# Patient Record
Sex: MALE | Race: WHITE | NOT HISPANIC OR LATINO | Employment: FULL TIME | URBAN - METROPOLITAN AREA
[De-identification: names, ages, dates, MRNs, and addresses within clinical notes are randomized per-mention and may not be internally consistent; named-entity substitution may affect disease eponyms.]

---

## 2019-09-13 DIAGNOSIS — M25.551 PAIN OF BOTH HIP JOINTS: Primary | ICD-10-CM

## 2019-09-13 DIAGNOSIS — M25.552 PAIN OF BOTH HIP JOINTS: Primary | ICD-10-CM

## 2019-09-20 ENCOUNTER — OFFICE VISIT (OUTPATIENT)
Dept: ORTHOPEDICS | Facility: CLINIC | Age: 49
End: 2019-09-20
Payer: COMMERCIAL

## 2019-09-20 ENCOUNTER — HOSPITAL ENCOUNTER (OUTPATIENT)
Dept: RADIOLOGY | Facility: HOSPITAL | Age: 49
Discharge: HOME | End: 2019-09-20
Attending: ORTHOPAEDIC SURGERY
Payer: COMMERCIAL

## 2019-09-20 VITALS
HEIGHT: 66 IN | SYSTOLIC BLOOD PRESSURE: 142 MMHG | BODY MASS INDEX: 34.55 KG/M2 | HEART RATE: 90 BPM | DIASTOLIC BLOOD PRESSURE: 92 MMHG | WEIGHT: 215 LBS

## 2019-09-20 DIAGNOSIS — Z96.641 H/O TOTAL HIP ARTHROPLASTY, RIGHT: Primary | ICD-10-CM

## 2019-09-20 DIAGNOSIS — M25.551 PAIN OF BOTH HIP JOINTS: ICD-10-CM

## 2019-09-20 DIAGNOSIS — M25.552 PAIN OF BOTH HIP JOINTS: ICD-10-CM

## 2019-09-20 DIAGNOSIS — M54.50 ACUTE RIGHT-SIDED LOW BACK PAIN WITHOUT SCIATICA: ICD-10-CM

## 2019-09-20 PROCEDURE — 99999 PR PBB SHADOW E&M-EST. PATIENT-LVL III: CPT | Mod: PBBFAC,,, | Performed by: ORTHOPAEDIC SURGERY

## 2019-09-20 PROCEDURE — 99999 PR PBB SHADOW E&M-EST. PATIENT-LVL III: ICD-10-PCS | Mod: PBBFAC,,, | Performed by: ORTHOPAEDIC SURGERY

## 2019-09-20 PROCEDURE — 3008F BODY MASS INDEX DOCD: CPT | Mod: CPTII,S$GLB,, | Performed by: ORTHOPAEDIC SURGERY

## 2019-09-20 PROCEDURE — 3008F PR BODY MASS INDEX (BMI) DOCUMENTED: ICD-10-PCS | Mod: CPTII,S$GLB,, | Performed by: ORTHOPAEDIC SURGERY

## 2019-09-20 PROCEDURE — 73521 XR HIPS BILATERAL 2 VIEW INCL AP PELVIS: ICD-10-PCS | Mod: 26,,, | Performed by: RADIOLOGY

## 2019-09-20 PROCEDURE — 99203 PR OFFICE/OUTPT VISIT, NEW, LEVL III, 30-44 MIN: ICD-10-PCS | Mod: S$GLB,,, | Performed by: ORTHOPAEDIC SURGERY

## 2019-09-20 PROCEDURE — 73521 X-RAY EXAM HIPS BI 2 VIEWS: CPT | Mod: 26,,, | Performed by: RADIOLOGY

## 2019-09-20 PROCEDURE — 73521 X-RAY EXAM HIPS BI 2 VIEWS: CPT | Mod: TC

## 2019-09-20 PROCEDURE — 99203 OFFICE O/P NEW LOW 30 MIN: CPT | Mod: S$GLB,,, | Performed by: ORTHOPAEDIC SURGERY

## 2019-09-20 RX ORDER — ASPIRIN 81 MG/1
81 TABLET ORAL DAILY
COMMUNITY

## 2019-09-20 RX ORDER — PHENTERMINE HYDROCHLORIDE 37.5 MG/1
37.5 TABLET ORAL DAILY
COMMUNITY
Start: 2019-09-11 | End: 2023-11-06

## 2019-09-20 RX ORDER — INDOMETHACIN 50 MG/1
50 CAPSULE ORAL 2 TIMES DAILY PRN
COMMUNITY
Start: 2019-09-11 | End: 2019-10-11

## 2019-09-20 RX ORDER — TIZANIDINE HYDROCHLORIDE 4 MG/1
4 CAPSULE, GELATIN COATED ORAL 3 TIMES DAILY PRN
COMMUNITY
End: 2023-11-06

## 2019-09-20 RX ORDER — SULFACETAMIDE SODIUM 100 MG/ML
SOLUTION/ DROPS OPHTHALMIC DAILY PRN
COMMUNITY
Start: 2019-07-15

## 2019-09-20 RX ORDER — TESTOSTERONE CYPIONATE 200 MG/ML
INJECTION, SOLUTION INTRAMUSCULAR
COMMUNITY

## 2019-09-20 RX ORDER — DEXTROAMPHETAMINE SACCHARATE, AMPHETAMINE ASPARTATE, DEXTROAMPHETAMINE SULFATE AND AMPHETAMINE SULFATE 5; 5; 5; 5 MG/1; MG/1; MG/1; MG/1
20 TABLET ORAL DAILY
COMMUNITY
Start: 2019-07-31 | End: 2023-11-06

## 2019-09-20 RX ORDER — BENAZEPRIL HYDROCHLORIDE 20 MG/1
20 TABLET ORAL DAILY
COMMUNITY
Start: 2019-08-15

## 2019-09-20 NOTE — PROGRESS NOTES
Subjective:     Patient ID: Kasi Main is a 49 y.o. male.    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  Has not been followed up and is here to make sure components are okay.  He is not having any pain in his groin or in his hip area.  He is back to regular activities.  A week ago started with some back pain on the a lumbar area.  He takes Indocin for gout and he is taking only 1 pill yesterday.  Denies loss of bowel bladder control denies any numbness or tingling in the legs denies pain in the other hip.  Denies any usage of any assistive devices and is back to his regular job.  He is doing independent exercise program  Past Medical History:   Diagnosis Date    ADD (attention deficit disorder)     Hypertension     Polycythemia      Past Surgical History:   Procedure Laterality Date    JOINT REPLACEMENT Right 08/26/2015    Right Hip     Family History   Problem Relation Age of Onset    Clotting disorder Mother     Diabetes Mother     Cancer Father      Social History     Socioeconomic History    Marital status:      Spouse name: Not on file    Number of children: Not on file    Years of education: Not on file    Highest education level: Not on file   Occupational History    Not on file   Social Needs    Financial resource strain: Not on file    Food insecurity:     Worry: Not on file     Inability: Not on file    Transportation needs:     Medical: Not on file     Non-medical: Not on file   Tobacco Use    Smoking status: Never Smoker    Smokeless tobacco: Current User     Types: Snuff   Substance and Sexual Activity    Alcohol use: Yes     Comment: rarely    Drug use: Never    Sexual activity: Not on file   Lifestyle    Physical activity:     Days per week: Not on  file     Minutes per session: Not on file    Stress: Not on file   Relationships    Social connections:     Talks on phone: Not on file     Gets together: Not on file     Attends Jewish service: Not on file     Active member of club or organization: Not on file     Attends meetings of clubs or organizations: Not on file     Relationship status: Not on file   Other Topics Concern    Not on file   Social History Narrative    Not on file     Medication List with Changes/Refills   Current Medications    ASPIRIN (ECOTRIN) 81 MG EC TABLET    Take 81 mg by mouth once daily.    BENAZEPRIL (LOTENSIN) 20 MG TABLET    Take 20 mg by mouth once daily.    DEXTROAMPHETAMINE-AMPHETAMINE (ADDERALL) 20 MG TABLET    Take 20 mg by mouth once daily.    INDOMETHACIN (INDOCIN) 50 MG CAPSULE    Take 50 mg by mouth 2 (two) times daily as needed.    PHENTERMINE (ADIPEX-P) 37.5 MG TABLET    Take 37.5 mg by mouth once daily.    SULFACETAMIDE SODIUM 10% (BLEPH-10) 10 % OPHTHALMIC SOLUTION    Place into both eyes daily as needed.    TESTOSTERONE CYPIONATE (DEPOTESTOTERONE CYPIONATE) 200 MG/ML INJECTION    Inject into the muscle every 14 (fourteen) days.    TIZANIDINE 4 MG CAP    Take 4 mg by mouth 3 (three) times daily as needed.     Review of patient's allergies indicates:  No Known Allergies  Review of Systems   Constitution: Negative for decreased appetite.   HENT: Negative for tinnitus.    Eyes: Negative for double vision.   Cardiovascular: Negative for chest pain.   Respiratory: Negative for wheezing.    Hematologic/Lymphatic: Negative for bleeding problem.   Skin: Negative for dry skin.   Musculoskeletal: Positive for back pain. Negative for arthritis, gout, neck pain and stiffness.   Gastrointestinal: Negative for abdominal pain.   Genitourinary: Negative for bladder incontinence.   Neurological: Negative for numbness, paresthesias and sensory change.   Psychiatric/Behavioral: Negative for altered mental status.       Objective:    Body mass index is 34.7 kg/m².  Vitals:    09/20/19 0810   BP: (!) 142/92   Pulse: 90          General    Constitutional: He is oriented to person, place, and time. He appears well-developed.   HENT:   Head: Normocephalic and atraumatic.   Eyes: EOM are normal.   Neck: Normal range of motion. Neck supple. No JVD present. No tracheal deviation present.   Cardiovascular: Normal rate.    Pulmonary/Chest: Effort normal. No respiratory distress. He has no wheezes.   Abdominal: Soft.   Lymphadenopathy:     He has no cervical adenopathy.   Neurological: He is alert and oriented to person, place, and time.   Psychiatric: Thought content normal.     General Musculoskeletal Exam   Gait: normal   Pelvic Obliquity: none    Right Ankle/Foot Exam   Right ankle exam is normal.    Range of Motion   The patient has normal right ankle ROM.  Ankle Joint   Dorsiflexion: normal   Plantar flexion: normal     Muscle Strength   The patient has normal right ankle strength.    Tests   Heel Walk: able to perform  Tiptoe Walk: able to perform    Left Ankle/Foot Exam   Left ankle exam is normal.    Range of Motion   The patient has normal left ankle ROM.   Ankle Joint  Dorsiflexion: normal   Plantar flexion: normal     Tests   Heel Walk: able to perform  Tiptoe Walk: able to perform    Right Knee Exam   Right knee exam is normal.    Inspection   Alignment:  normal  Effusion: absent  Deformity: absent    Range of Motion   Extension: normal   Flexion: normal     Tests   Meniscus   Park:  Medial - negative Lateral - negative  Ligament Examination Lachman: normal (-1 to 2mm) PCL-Posterior Drawer: normal (0 to 2mm)     MCL - Valgus: normal (0 to 2mm)  LCL - Varus: normal  Posterior Sag Test: negative  Patella   Patellar apprehension: negative  Passive Patellar Tilt: neutral  Patellar Tracking: normal    Other   Sensation: normal    Left Knee Exam   Left knee exam is normal.    Inspection   Alignment:  normal  Effusion: absent  Deformity:  absent    Range of Motion   Extension: normal   Flexion: normal     Tests   Meniscus   Park:  Medial - negative Lateral - negative  Stability Lachman: normal (-1 to 2mm) PCL-Posterior Drawer: normal (0 to 2mm)  MCL - Valgus: normal (0 to 2mm)  LCL - Varus: normal (0 to 2mm)  Posterior Sag Test: negative  Patella   Patellar apprehension: negative  Passive Patellar Tilt: neutral  Patellar Tracking: normal    Other   Sensation: normal    Right Hip Exam   Right hip exam is normal.     Inspection   Scars: present  No deformity of hip.  Quadriceps Atrophy:  Negative    Range of Motion   Abduction: normal   Adduction: normal   Extension: normal   Flexion: normal   External rotation: normal   Internal rotation: normal     Muscle Strength   The patient has normal right hip strength.    Other   Sensation: normal  Left Hip Exam   Left hip exam is normal.    Inspection   No deformity of hip.  Quadriceps Atrophy:  negative    Range of Motion   Abduction: normal   Adduction: normal   Extension: normal   Flexion: normal   External rotation: normal   Internal rotation: normal     Muscle Strength   The patient has normal left hip strength.     Other   Sensation: normal      Back (L-Spine & T-Spine) / Neck (C-Spine) Exam     Tenderness Right paramedian tenderness of the Lower L-Spine. Left paramedian tenderness of the Lower L-Spine.     Back (L-Spine & T-Spine) Range of Motion   Extension:  10 abnormal   Flexion:  60 abnormal   Lateral bend right: normal   Lateral bend left: normal   Rotation right: normal   Rotation left: normal     Neck (C-Spine) Range of Motion   Flexion:     Normal  Extension: Normal  Right Lateral Bend: normal  Left Lateral Bend: normal  Right Rotation: normal  Left Rotation: normal    Spinal Sensation   Right Side Sensation  C-Spine Level: normal   L-Spine Level: normal  S-Spine Level: normal  T-Spine Level: normal  Left Side Sensation  C-Spine Level: normal  L-Spine Level: normal  S-Spine Level:  normal  T-Spine Level: normal    Other He has no scoliosis .  Spinal Kyphosis:  Absent  Spinal Lordosis:  Absent  Head Tilt:  Negative    Comments:  Forward bending to mid tibia lateral bending to the knee.  Pain localized around L4-L5 paraspinal area bilaterally      Right Hand/Wrist Exam   Right hand exam is normal.    Inspection   Effusion: Wrist - absent Hand -  absent  Deformity: Wrist - deformity Hand -  deformity    Tests   Phalens Sign: negative  Tinel's sign (median nerve): negative  Finkelstein's test: negative  Carpal Tunnel Compression Test: negative  Cubital Tunnel Compression Test: negative        Left Hand/Wrist Exam   Left hand exam is normal.    Inspection   Effusion: Wrist - absent Hand -  absent  Deformity: Wrist - absent Hand -  absent    Tests   Phalens Sign: negative  Tinel's sign (median nerve): negative  Finkelstein's test: negative  Carpal Tunnel Compression Test: negative  Cubital Tunnel Compression Test: negative        Right Elbow Exam   Right elbow exam is normal.    Tests   Tinel's sign (cubital tunnel): negative      Left Elbow Exam   Left elbow exam is normal.    Tests   Tinel's sign (cubital tunnel): negative    Right Shoulder Exam   Right shoulder exam is normal.    Inspection/Observation   Bruising: absent  Deformity: absent  Scapular Winging: absent  Atrophy: absent    Tenderness   The patient is experiencing no tenderness.    Range of Motion   Active abduction:  130 normal   Passive abduction: normal   Extension: normal   Forward Flexion: normal   Forward Elevation: normal  Adduction: normal  External Rotation 90 degrees: normal  Internal rotation 0 degrees: normal   Internal rotation 90 degrees: normal     Tests & Signs   Apprehension: negative  Cross arm: negative  Drop arm: negative  Impingement: negative  Rotator Cuff Painful Arc/Range: mild  Lift Off Sign: negative  Relocation 90 degrees: negative    Other   Sensation: normal    Left Shoulder Exam   Left shoulder exam is  normal.    Inspection/Observation   Bruising: absent  Deformity: absent  Scapular Winging: absent  Atrophy: absent    Tenderness   The patient is experiencing no tenderness.     Range of Motion   Active abduction: normal   Passive abduction: normal   Extension: normal   Forward Flexion: normal   Forward Elevation: normal  Adduction: normal  External Rotation 0 degrees: normal   External Rotation 90 degrees: normal  Internal rotation 0 degrees: normal   Internal rotation 90 degrees: normal     Tests & Signs   Apprehension: negative  Cross arm: negative  Drop arm: negative  Impingement: negative  Lift Off Sign: negative  Relocation 90 degrees: negative    Other   Sensation: normal       Muscle Strength   Right Upper Extremity   Shoulder Abduction:    Shoulder Internal Rotation:    Shoulder External Rotation:    Supraspinatus:    Subscapularis:    Biceps:    Deltoid:    Triceps:    Wrist extension:    Wrist flexion:    Finger Flexors:    Finger Extensors:    Left Upper Extremity  Shoulder Abduction:    Shoulder Internal Rotation:    Shoulder External Rotation:    Supraspinatus:    Subscapularis:    Biceps:    Deltoid:    Triceps:    Wrist extension:    Wrist flexion:    Finger Flexors:    Finger Extensors:    Right Lower Extremity   Hip Abduction:    Hip Flexion:    Hip Extensors:   Quadriceps:     Hamstrin/5   Anterior tibial:    Gastrocsoleus:    EHL:    Left Lower Extremity   Hip Abduction:    Hip Flexion:    Hip Extensors:   Quadriceps:     Hamstrin/5   Anterior tibial:     EHL:      Reflexes     Left Side  Biceps:  1+  Triceps:  1+  Quadriceps:  1+  Achilles:  1+    Right Side   Biceps:  1+  Triceps:  1+  Quadriceps:  1+  Achilles:  1+    Vascular Exam     Right Pulses  Dorsalis Pedis:      1+  Posterior Tibial:      1+  Radial:                    1+      Left  Pulses  Dorsalis Pedis:      1+  Posterior Tibial:      1+  Radial:                    1+      Capillary Refill  Right Hand: normal capillary refill  Left Hand: normal capillary refill      Relevant imaging results reviewed and interpreted by me, discussed with the patient and / or family today   X-ray 09/20/2019 bilateral hips and AP pelvis showing a right total hip arthroplasty without poly wear excellent alignment.  Left hip joint space intact. No evidence of avascular necrosis  Assessment:     Encounter Diagnoses   Name Primary?    H/O total hip arthroplasty, right Yes    Acute right-sided low back pain without sciatica         Plan:   H/O total hip arthroplasty, right    Acute right-sided low back pain without sciatica         Has history of gout and Indocin at home 50 mg.  Told him to take 50 mg t.i.d. with food for 2-3 days in order to minimize his back pain.  Stretching exercises.  Swimming.  If things do not improve within a week or 10 days he can call us to return for re-evaluation and possible needing x-rays  As far as his right total hip arthroplasty is doing extremely well no evidence of poly wear needs to follow up in a year and half or an as-needed basis      Disclaimer: This note was prepared using a voice recognition system and is likely to have sound alike errors within the text.

## 2019-09-20 NOTE — LETTER
September 20, 2019      Yumi Boyer MD  [address]  [place] [address]           [facility]  [address] [place] [id number]  Phone: [phone]  Fax: [phone]          Patient: Kasi Main   MR Number: [id number]   YOB: 1970   Date of Visit: 9/20/2019       Dear Dr. Yumi Boyer:    Thank you for referring Kasi Main to me for evaluation. Attached you will find relevant portions of my assessment and plan of care.    If you have questions, please do not hesitate to call me. I look forward to following Kasi Main along with you.    Sincerely,    Cal Lainez MD    Enclosure  CC:  No Recipients    If you would like to receive this communication electronically, please contact [email or web address] or [phone] to request more information on [facility] access.    For providers and/or their staff who would like to refer a patient to [facility], please contact us through our one-stop-shop provider referral line, [facility], at [phone].    If you feel you have received this communication in error or would no longer like to receive these types of communications, please e-mail [email or web address]

## 2021-11-01 DIAGNOSIS — M25.559 HIP PAIN: Primary | ICD-10-CM

## 2021-11-17 ENCOUNTER — TELEPHONE (OUTPATIENT)
Dept: ORTHOPEDICS | Facility: CLINIC | Age: 51
End: 2021-11-17
Payer: COMMERCIAL

## 2021-11-17 ENCOUNTER — PATIENT MESSAGE (OUTPATIENT)
Dept: ORTHOPEDICS | Facility: CLINIC | Age: 51
End: 2021-11-17
Payer: COMMERCIAL

## 2021-11-26 ENCOUNTER — HOSPITAL ENCOUNTER (OUTPATIENT)
Dept: RADIOLOGY | Facility: HOSPITAL | Age: 51
Discharge: HOME | End: 2021-11-26
Attending: ORTHOPAEDIC SURGERY
Payer: COMMERCIAL

## 2021-11-26 DIAGNOSIS — M25.559 HIP PAIN: ICD-10-CM

## 2021-11-26 PROCEDURE — 73521 X-RAY EXAM HIPS BI 2 VIEWS: CPT | Mod: TC

## 2021-11-26 PROCEDURE — 73521 XR HIPS BILATERAL 2 VIEW INCL AP PELVIS: ICD-10-PCS | Mod: 26,,, | Performed by: RADIOLOGY

## 2021-11-26 PROCEDURE — 73521 X-RAY EXAM HIPS BI 2 VIEWS: CPT | Mod: 26,,, | Performed by: RADIOLOGY

## 2021-12-01 DIAGNOSIS — M25.559 HIP PAIN: Primary | ICD-10-CM

## 2021-12-23 ENCOUNTER — HOSPITAL ENCOUNTER (OUTPATIENT)
Dept: RADIOLOGY | Facility: HOSPITAL | Age: 51
Discharge: HOME | End: 2021-12-23
Attending: ORTHOPAEDIC SURGERY
Payer: COMMERCIAL

## 2021-12-23 ENCOUNTER — OFFICE VISIT (OUTPATIENT)
Dept: ORTHOPEDICS | Facility: CLINIC | Age: 51
End: 2021-12-23
Payer: COMMERCIAL

## 2021-12-23 VITALS — HEART RATE: 93 BPM | SYSTOLIC BLOOD PRESSURE: 129 MMHG | DIASTOLIC BLOOD PRESSURE: 83 MMHG

## 2021-12-23 DIAGNOSIS — Z96.641 H/O TOTAL HIP ARTHROPLASTY, RIGHT: Primary | ICD-10-CM

## 2021-12-23 DIAGNOSIS — M54.50 LOW BACK PAIN, UNSPECIFIED BACK PAIN LATERALITY, UNSPECIFIED CHRONICITY, UNSPECIFIED WHETHER SCIATICA PRESENT: ICD-10-CM

## 2021-12-23 DIAGNOSIS — M10.9 GOUT, UNSPECIFIED CAUSE, UNSPECIFIED CHRONICITY, UNSPECIFIED SITE: ICD-10-CM

## 2021-12-23 DIAGNOSIS — M25.559 HIP PAIN: ICD-10-CM

## 2021-12-23 PROBLEM — I10 ESSENTIAL HYPERTENSION: Status: ACTIVE | Noted: 2020-11-13

## 2021-12-23 PROBLEM — Z87.39 HISTORY OF GOUT: Status: ACTIVE | Noted: 2020-01-24

## 2021-12-23 PROBLEM — F90.0 ATTENTION DEFICIT HYPERACTIVITY DISORDER (ADHD), PREDOMINANTLY INATTENTIVE TYPE: Status: ACTIVE | Noted: 2021-07-30

## 2021-12-23 PROBLEM — E11.9 NEW ONSET TYPE 2 DIABETES MELLITUS: Status: ACTIVE | Noted: 2020-11-13

## 2021-12-23 PROBLEM — R79.89 LOW TESTOSTERONE: Status: ACTIVE | Noted: 2019-06-14

## 2021-12-23 PROBLEM — D75.1 POLYCYTHEMIA: Status: ACTIVE | Noted: 2018-08-31

## 2021-12-23 PROCEDURE — 3079F DIAST BP 80-89 MM HG: CPT | Mod: CPTII,S$GLB,, | Performed by: ORTHOPAEDIC SURGERY

## 2021-12-23 PROCEDURE — 1160F RVW MEDS BY RX/DR IN RCRD: CPT | Mod: CPTII,S$GLB,, | Performed by: ORTHOPAEDIC SURGERY

## 2021-12-23 PROCEDURE — 3074F SYST BP LT 130 MM HG: CPT | Mod: CPTII,S$GLB,, | Performed by: ORTHOPAEDIC SURGERY

## 2021-12-23 PROCEDURE — 1159F MED LIST DOCD IN RCRD: CPT | Mod: CPTII,S$GLB,, | Performed by: ORTHOPAEDIC SURGERY

## 2021-12-23 PROCEDURE — 99999 PR PBB SHADOW E&M-EST. PATIENT-LVL III: CPT | Mod: PBBFAC,,, | Performed by: ORTHOPAEDIC SURGERY

## 2021-12-23 PROCEDURE — 4010F PR ACE/ARB THEARPY RXD/TAKEN: ICD-10-PCS | Mod: CPTII,S$GLB,, | Performed by: ORTHOPAEDIC SURGERY

## 2021-12-23 PROCEDURE — 3074F PR MOST RECENT SYSTOLIC BLOOD PRESSURE < 130 MM HG: ICD-10-PCS | Mod: CPTII,S$GLB,, | Performed by: ORTHOPAEDIC SURGERY

## 2021-12-23 PROCEDURE — 99213 PR OFFICE/OUTPT VISIT, EST, LEVL III, 20-29 MIN: ICD-10-PCS | Mod: S$GLB,,, | Performed by: ORTHOPAEDIC SURGERY

## 2021-12-23 PROCEDURE — 4010F ACE/ARB THERAPY RXD/TAKEN: CPT | Mod: CPTII,S$GLB,, | Performed by: ORTHOPAEDIC SURGERY

## 2021-12-23 PROCEDURE — 1160F PR REVIEW ALL MEDS BY PRESCRIBER/CLIN PHARMACIST DOCUMENTED: ICD-10-PCS | Mod: CPTII,S$GLB,, | Performed by: ORTHOPAEDIC SURGERY

## 2021-12-23 PROCEDURE — 99213 OFFICE O/P EST LOW 20 MIN: CPT | Mod: S$GLB,,, | Performed by: ORTHOPAEDIC SURGERY

## 2021-12-23 PROCEDURE — 1159F PR MEDICATION LIST DOCUMENTED IN MEDICAL RECORD: ICD-10-PCS | Mod: CPTII,S$GLB,, | Performed by: ORTHOPAEDIC SURGERY

## 2021-12-23 PROCEDURE — 3079F PR MOST RECENT DIASTOLIC BLOOD PRESSURE 80-89 MM HG: ICD-10-PCS | Mod: CPTII,S$GLB,, | Performed by: ORTHOPAEDIC SURGERY

## 2021-12-23 PROCEDURE — 99999 PR PBB SHADOW E&M-EST. PATIENT-LVL III: ICD-10-PCS | Mod: PBBFAC,,, | Performed by: ORTHOPAEDIC SURGERY

## 2021-12-23 RX ORDER — SEMAGLUTIDE 1.34 MG/ML
1 INJECTION, SOLUTION SUBCUTANEOUS
COMMUNITY
Start: 2021-03-19

## 2021-12-23 RX ORDER — FEBUXOSTAT 80 MG/1
1 TABLET, FILM COATED ORAL
COMMUNITY
Start: 2021-10-11

## 2021-12-23 RX ORDER — ATORVASTATIN CALCIUM 10 MG/1
10 TABLET, FILM COATED ORAL
COMMUNITY
Start: 2021-06-28

## 2021-12-23 RX ORDER — DEXTROSE 4 G
1 TABLET,CHEWABLE ORAL
COMMUNITY
Start: 2021-01-29

## 2021-12-23 RX ORDER — FLUTICASONE PROPIONATE 50 MCG
1 SPRAY, SUSPENSION (ML) NASAL
COMMUNITY
Start: 2021-12-03 | End: 2022-06-01

## 2021-12-23 RX ORDER — METFORMIN HYDROCHLORIDE 500 MG/1
TABLET, EXTENDED RELEASE ORAL
COMMUNITY
Start: 2021-01-04

## 2021-12-23 RX ORDER — ERGOCALCIFEROL 1.25 MG/1
CAPSULE ORAL
COMMUNITY
Start: 2021-01-07

## 2023-09-20 DIAGNOSIS — M25.552 BILATERAL HIP PAIN: ICD-10-CM

## 2023-09-20 DIAGNOSIS — M25.551 BILATERAL HIP PAIN: ICD-10-CM

## 2023-09-20 DIAGNOSIS — Z96.641 H/O TOTAL HIP ARTHROPLASTY, RIGHT: Primary | ICD-10-CM

## 2023-11-03 DIAGNOSIS — M54.50 LUMBAR PAIN: Primary | ICD-10-CM

## 2023-11-06 ENCOUNTER — HOSPITAL ENCOUNTER (OUTPATIENT)
Dept: RADIOLOGY | Facility: HOSPITAL | Age: 53
Discharge: HOME | End: 2023-11-06
Attending: ORTHOPAEDIC SURGERY
Payer: COMMERCIAL

## 2023-11-06 ENCOUNTER — OFFICE VISIT (OUTPATIENT)
Dept: ORTHOPEDICS | Facility: CLINIC | Age: 53
End: 2023-11-06
Payer: COMMERCIAL

## 2023-11-06 VITALS — BODY MASS INDEX: 32.95 KG/M2 | WEIGHT: 205 LBS | HEIGHT: 66 IN

## 2023-11-06 DIAGNOSIS — M25.551 BILATERAL HIP PAIN: ICD-10-CM

## 2023-11-06 DIAGNOSIS — M25.552 BILATERAL HIP PAIN: ICD-10-CM

## 2023-11-06 DIAGNOSIS — M54.50 LUMBAR PAIN: ICD-10-CM

## 2023-11-06 DIAGNOSIS — Z96.641 H/O TOTAL HIP ARTHROPLASTY, RIGHT: ICD-10-CM

## 2023-11-06 DIAGNOSIS — M10.9 GOUT, UNSPECIFIED CAUSE, UNSPECIFIED CHRONICITY, UNSPECIFIED SITE: ICD-10-CM

## 2023-11-06 DIAGNOSIS — Z96.641 H/O TOTAL HIP ARTHROPLASTY, RIGHT: Primary | ICD-10-CM

## 2023-11-06 DIAGNOSIS — M43.16 SPONDYLOLISTHESIS, LUMBAR REGION: ICD-10-CM

## 2023-11-06 PROCEDURE — 99213 OFFICE O/P EST LOW 20 MIN: CPT | Mod: S$GLB,,, | Performed by: ORTHOPAEDIC SURGERY

## 2023-11-06 PROCEDURE — 3008F BODY MASS INDEX DOCD: CPT | Mod: CPTII,S$GLB,, | Performed by: ORTHOPAEDIC SURGERY

## 2023-11-06 PROCEDURE — 99999 PR PBB SHADOW E&M-EST. PATIENT-LVL III: ICD-10-PCS | Mod: PBBFAC,,, | Performed by: ORTHOPAEDIC SURGERY

## 2023-11-06 PROCEDURE — 1159F MED LIST DOCD IN RCRD: CPT | Mod: CPTII,S$GLB,, | Performed by: ORTHOPAEDIC SURGERY

## 2023-11-06 PROCEDURE — 1159F PR MEDICATION LIST DOCUMENTED IN MEDICAL RECORD: ICD-10-PCS | Mod: CPTII,S$GLB,, | Performed by: ORTHOPAEDIC SURGERY

## 2023-11-06 PROCEDURE — 3044F HG A1C LEVEL LT 7.0%: CPT | Mod: CPTII,S$GLB,, | Performed by: ORTHOPAEDIC SURGERY

## 2023-11-06 PROCEDURE — 99999 PR PBB SHADOW E&M-EST. PATIENT-LVL III: CPT | Mod: PBBFAC,,, | Performed by: ORTHOPAEDIC SURGERY

## 2023-11-06 PROCEDURE — 73521 X-RAY EXAM HIPS BI 2 VIEWS: CPT | Mod: TC

## 2023-11-06 PROCEDURE — 72110 X-RAY EXAM L-2 SPINE 4/>VWS: CPT | Mod: 26,,, | Performed by: RADIOLOGY

## 2023-11-06 PROCEDURE — 73521 XR HIPS BILATERAL 2 VIEW INCL AP PELVIS: ICD-10-PCS | Mod: 26,,, | Performed by: RADIOLOGY

## 2023-11-06 PROCEDURE — 72110 X-RAY EXAM L-2 SPINE 4/>VWS: CPT | Mod: TC

## 2023-11-06 PROCEDURE — 73521 X-RAY EXAM HIPS BI 2 VIEWS: CPT | Mod: 26,,, | Performed by: RADIOLOGY

## 2023-11-06 PROCEDURE — 3008F PR BODY MASS INDEX (BMI) DOCUMENTED: ICD-10-PCS | Mod: CPTII,S$GLB,, | Performed by: ORTHOPAEDIC SURGERY

## 2023-11-06 PROCEDURE — 99213 PR OFFICE/OUTPT VISIT, EST, LEVL III, 20-29 MIN: ICD-10-PCS | Mod: S$GLB,,, | Performed by: ORTHOPAEDIC SURGERY

## 2023-11-06 PROCEDURE — 72110 XR LUMBAR SPINE AP AND LAT WITH FLEX/EXT: ICD-10-PCS | Mod: 26,,, | Performed by: RADIOLOGY

## 2023-11-06 PROCEDURE — 4010F ACE/ARB THERAPY RXD/TAKEN: CPT | Mod: CPTII,S$GLB,, | Performed by: ORTHOPAEDIC SURGERY

## 2023-11-06 PROCEDURE — 4010F PR ACE/ARB THEARPY RXD/TAKEN: ICD-10-PCS | Mod: CPTII,S$GLB,, | Performed by: ORTHOPAEDIC SURGERY

## 2023-11-06 PROCEDURE — 3044F PR MOST RECENT HEMOGLOBIN A1C LEVEL <7.0%: ICD-10-PCS | Mod: CPTII,S$GLB,, | Performed by: ORTHOPAEDIC SURGERY

## 2023-11-06 NOTE — PATIENT INSTRUCTIONS
X-ray today of the lumbar spine showing very mild slippage of L5 on S1 very mild arthritis and it is stable with flexion and extension x-rays   Your x-ray on your right hip still holding excellent alignment  You have occasional back pain but not severe just recently after you walk a lot on a trip  You can take Tylenol as needed   I will see you back in 2 years

## 2023-11-06 NOTE — PROGRESS NOTES
Subjective:     Patient ID: Kasi Main is a 53 y.o. male.    Chief Complaint: Pain of the Right Hip    HPI:  12/23/2021    I did perform right total hip arthroplasty 08/26/2015 using Smith and Nephew components.  Last time seen in 2019. He said he is doing extremely well occasionally he gets low back pain but no radiation down the leg.  He was having some discomfort in the left hip and right hip when he was squatting going underneath the scaffold but that subsided after 2-3 days.  He still working at this time.  We did discuss vaccination since he is not vaccinated against COVID and spent a long time with him.  His wife is vaccinated is here and she works at [employer].  (His wife just had surgery with Dr. Ro is arthroscopic surgery of her hip with repair of the labrum and femoral acetabular impingement debridement of the neck and that helped for line month and now she is having hip pain again) his pain is 0/10 about his hips now.  He maintains very active life.  No numbness or tingling in the legs.  No loss of bowel bladder control  No fever no chills no shortness of breath difficulty with chewing or swallowing loss of bowel bladder control blurry vision double vision or loss of sense smell or taste    Patient stated he did get COVID but not vaccinated    11/06/2023   Follow-up on right LINDA 08/26/2015.  Patient doing extremely well with the hip without any pain since we replaced it.  He was having some back pain last time we saw him and he used taking care of it and avoiding doing too much heavy lifting.  They recently went on a trip and then he had to do a lot of walking started with some mild discomfort.  Tylenol seems to work.  Now his pain is 0/10.  Last time he was here we decided that we need to proceed with x-ray for evaluation.  He is very pleased with the results on his right hip.  His left hip does not bother him.  Denies any numbness and tingling or usage of any assistive devices  Past  Medical History:   Diagnosis Date    ADD (attention deficit disorder)     Hypertension     Polycythemia      Past Surgical History:   Procedure Laterality Date    COLONOSCOPY  2023    JOINT REPLACEMENT Right 08/26/2015    Right Hip     Family History   Problem Relation Age of Onset    Clotting disorder Mother     Diabetes Mother     Cancer Father      Social History     Socioeconomic History    Marital status:    Tobacco Use    Smoking status: Never    Smokeless tobacco: Current     Types: Snuff   Substance and Sexual Activity    Alcohol use: Yes     Comment: rarely    Drug use: Never     Medication List with Changes/Refills   Current Medications    ASPIRIN (ECOTRIN) 81 MG EC TABLET    Take 81 mg by mouth once daily.    ATORVASTATIN (LIPITOR) 10 MG TABLET    Take 10 mg by mouth.    BENAZEPRIL (LOTENSIN) 20 MG TABLET    Take 20 mg by mouth once daily.    BLOOD SUGAR DIAGNOSTIC (BLOOD GLUCOSE TEST) STRP    1 strip by Other route.    BLOOD-GLUCOSE METER MISC    1 each by Other route.    ERGOCALCIFEROL (ERGOCALCIFEROL) 50,000 UNIT CAP        FEBUXOSTAT (ULORIC) 80 MG TAB    Take 1 tablet by mouth.    METFORMIN (GLUCOPHAGE-XR) 500 MG ER 24HR TABLET        SEMAGLUTIDE (OZEMPIC) 0.25 MG OR 0.5 MG(2 MG/1.5 ML) PEN INJECTOR    1 mg.    SULFACETAMIDE SODIUM 10% (BLEPH-10) 10 % OPHTHALMIC SOLUTION    Place into both eyes daily as needed.    TESTOSTERONE CYPIONATE (DEPOTESTOTERONE CYPIONATE) 200 MG/ML INJECTION    Inject into the muscle every 14 (fourteen) days.   Discontinued Medications    DEXTROAMPHETAMINE-AMPHETAMINE (ADDERALL) 20 MG TABLET    Take 20 mg by mouth once daily.    PHENTERMINE (ADIPEX-P) 37.5 MG TABLET    Take 37.5 mg by mouth once daily.    TIZANIDINE 4 MG CAP    Take 4 mg by mouth 3 (three) times daily as needed.     Review of patient's allergies indicates:   Allergen Reactions    Toradol [ketorolac] Other (See Comments)    Latex Rash     Review of Systems   Constitutional: Negative for decreased  appetite.   HENT:  Negative for tinnitus.    Eyes:  Negative for double vision.   Cardiovascular:  Negative for chest pain.   Respiratory:  Negative for wheezing.    Hematologic/Lymphatic: Negative for bleeding problem.   Skin:  Negative for dry skin.   Musculoskeletal:  Positive for back pain and gout. Negative for arthritis, neck pain and stiffness.   Gastrointestinal:  Negative for abdominal pain.   Genitourinary:  Negative for bladder incontinence.   Neurological:  Negative for numbness, paresthesias and sensory change.   Psychiatric/Behavioral:  Negative for altered mental status.        Objective:   Body mass index is 33.09 kg/m².  There were no vitals filed for this visit.         General    Constitutional: He is oriented to person, place, and time. He appears well-developed.   HENT:   Head: Atraumatic.   Eyes: EOM are normal.   Cardiovascular:  Normal rate.            Pulmonary/Chest: Effort normal.   Neurological: He is alert and oriented to person, place, and time.   Psychiatric: Judgment normal.           Ambulating without any assistive devices  Lumbar with slight discomfort around L4-L5 paraspinal.  No deformity seen.  Negative straight leg raising bilaterally  Pelvis right hip full range of motion.  Surgical scar healed well.  LINDA doing well.  No pain in the groin.  Left hip without pain to internal external rotation today.  Bilateral hips flexors abductors adductors are 5/5  Bilateral knees full motion no pain  Collaterals and cruciates are stable bilateral knees  Ankle motion is intact      Relevant imaging results reviewed and interpreted by me, discussed with the patient and / or family today     X-ray 11/06/2023 right LINDA in excellent alignment no evidence of failure no superior migration of the acetabular and femoral head everything looks normal like before.  Lumbar x-ray LS spine also showing mild L5 S1 spondylo with no change in flexion and extension and stable very mild facet changes  X-ray  from November 2021 AP pelvis showing mild degenerative changes in the lumbar spine.  Sacroiliac joints are okay.  Right total hip arthroplasty in excellent alignment no evidence of poly wear.  Left hip no evidence of arthritis or bony spurs or fractures  Assessment:     Encounter Diagnoses   Name Primary?    H/O total hip arthroplasty, right Yes    Gout, unspecified cause, unspecified chronicity, unspecified site     Spondylolisthesis, lumbar region         Plan:   H/O total hip arthroplasty, right    Gout, unspecified cause, unspecified chronicity, unspecified site    Spondylolisthesis, lumbar region         Patient Instructions   X-ray today of the lumbar spine showing very mild slippage of L5 on S1 very mild arthritis and it is stable with flexion and extension x-rays   Your x-ray on your right hip still holding excellent alignment  You have occasional back pain but not severe just recently after you walk a lot on a trip  You can take Tylenol as needed   I will see you back in 2 years    Disclaimer: This note was prepared using a voice recognition system and is likely to have sound alike errors within the text.

## 2024-03-27 ENCOUNTER — TELEPHONE (OUTPATIENT)
Dept: INFUSION THERAPY | Facility: HOSPITAL | Age: 54
End: 2024-03-27
Payer: COMMERCIAL

## 2024-03-28 ENCOUNTER — TELEPHONE (OUTPATIENT)
Dept: HEMATOLOGY/ONCOLOGY | Facility: CLINIC | Age: 54
End: 2024-03-28
Payer: COMMERCIAL

## 2024-03-28 DIAGNOSIS — D75.1 POLYCYTHEMIA: Primary | ICD-10-CM

## 2024-05-17 ENCOUNTER — OFFICE VISIT (OUTPATIENT)
Dept: HEMATOLOGY/ONCOLOGY | Facility: CLINIC | Age: 54
End: 2024-05-17
Payer: COMMERCIAL

## 2024-05-17 ENCOUNTER — LAB VISIT (OUTPATIENT)
Dept: LAB | Facility: HOSPITAL | Age: 54
End: 2024-05-17
Attending: INTERNAL MEDICINE
Payer: COMMERCIAL

## 2024-05-17 VITALS
DIASTOLIC BLOOD PRESSURE: 84 MMHG | HEIGHT: 67 IN | OXYGEN SATURATION: 98 % | HEART RATE: 90 BPM | WEIGHT: 200.38 LBS | SYSTOLIC BLOOD PRESSURE: 146 MMHG | BODY MASS INDEX: 31.45 KG/M2 | TEMPERATURE: 98 F

## 2024-05-17 DIAGNOSIS — D75.1 SECONDARY POLYCYTHEMIA: Primary | ICD-10-CM

## 2024-05-17 DIAGNOSIS — D75.1 POLYCYTHEMIA: ICD-10-CM

## 2024-05-17 DIAGNOSIS — Z79.890 LONG-TERM CURRENT USE OF TESTOSTERONE REPLACEMENT THERAPY: ICD-10-CM

## 2024-05-17 DIAGNOSIS — G47.33 OSA (OBSTRUCTIVE SLEEP APNEA): ICD-10-CM

## 2024-05-17 LAB
ALBUMIN SERPL BCP-MCNC: 3.7 G/DL (ref 3.5–5.2)
ALP SERPL-CCNC: 66 U/L (ref 55–135)
ALT SERPL W/O P-5'-P-CCNC: 23 U/L (ref 10–44)
ANION GAP SERPL CALC-SCNC: 10 MMOL/L (ref 8–16)
AST SERPL-CCNC: 19 U/L (ref 10–40)
BASOPHILS # BLD AUTO: 0.04 K/UL (ref 0–0.2)
BASOPHILS NFR BLD: 0.5 % (ref 0–1.9)
BILIRUB SERPL-MCNC: 0.3 MG/DL (ref 0.1–1)
BUN SERPL-MCNC: 16 MG/DL (ref 6–20)
CALCIUM SERPL-MCNC: 9.7 MG/DL (ref 8.7–10.5)
CHLORIDE SERPL-SCNC: 107 MMOL/L (ref 95–110)
CO2 SERPL-SCNC: 24 MMOL/L (ref 23–29)
CREAT SERPL-MCNC: 1 MG/DL (ref 0.5–1.4)
DIFFERENTIAL METHOD BLD: ABNORMAL
EOSINOPHIL # BLD AUTO: 0.2 K/UL (ref 0–0.5)
EOSINOPHIL NFR BLD: 2.3 % (ref 0–8)
ERYTHROCYTE [DISTWIDTH] IN BLOOD BY AUTOMATED COUNT: 16.4 % (ref 11.5–14.5)
EST. GFR  (NO RACE VARIABLE): >60 ML/MIN/1.73 M^2
GLUCOSE SERPL-MCNC: 133 MG/DL (ref 70–110)
HCT VFR BLD AUTO: 44.2 % (ref 40–54)
HGB BLD-MCNC: 14.1 G/DL (ref 14–18)
IMM GRANULOCYTES # BLD AUTO: 0.02 K/UL (ref 0–0.04)
IMM GRANULOCYTES NFR BLD AUTO: 0.2 % (ref 0–0.5)
LYMPHOCYTES # BLD AUTO: 2.6 K/UL (ref 1–4.8)
LYMPHOCYTES NFR BLD: 31.2 % (ref 18–48)
MCH RBC QN AUTO: 26 PG (ref 27–31)
MCHC RBC AUTO-ENTMCNC: 31.9 G/DL (ref 32–36)
MCV RBC AUTO: 82 FL (ref 82–98)
MONOCYTES # BLD AUTO: 0.5 K/UL (ref 0.3–1)
MONOCYTES NFR BLD: 5.6 % (ref 4–15)
NEUTROPHILS # BLD AUTO: 4.9 K/UL (ref 1.8–7.7)
NEUTROPHILS NFR BLD: 60.2 % (ref 38–73)
NRBC BLD-RTO: 0 /100 WBC
PLATELET # BLD AUTO: 308 K/UL (ref 150–450)
PMV BLD AUTO: 8.7 FL (ref 9.2–12.9)
POTASSIUM SERPL-SCNC: 4.6 MMOL/L (ref 3.5–5.1)
PROT SERPL-MCNC: 7 G/DL (ref 6–8.4)
RBC # BLD AUTO: 5.42 M/UL (ref 4.6–6.2)
SODIUM SERPL-SCNC: 141 MMOL/L (ref 136–145)
WBC # BLD AUTO: 8.2 K/UL (ref 3.9–12.7)

## 2024-05-17 PROCEDURE — 4010F ACE/ARB THERAPY RXD/TAKEN: CPT | Mod: CPTII,S$GLB,, | Performed by: NURSE PRACTITIONER

## 2024-05-17 PROCEDURE — 3044F HG A1C LEVEL LT 7.0%: CPT | Mod: CPTII,S$GLB,, | Performed by: NURSE PRACTITIONER

## 2024-05-17 PROCEDURE — 1160F RVW MEDS BY RX/DR IN RCRD: CPT | Mod: CPTII,S$GLB,, | Performed by: NURSE PRACTITIONER

## 2024-05-17 PROCEDURE — 80053 COMPREHEN METABOLIC PANEL: CPT | Performed by: INTERNAL MEDICINE

## 2024-05-17 PROCEDURE — 3077F SYST BP >= 140 MM HG: CPT | Mod: CPTII,S$GLB,, | Performed by: NURSE PRACTITIONER

## 2024-05-17 PROCEDURE — 99205 OFFICE O/P NEW HI 60 MIN: CPT | Mod: S$GLB,,, | Performed by: NURSE PRACTITIONER

## 2024-05-17 PROCEDURE — 36415 COLL VENOUS BLD VENIPUNCTURE: CPT | Performed by: INTERNAL MEDICINE

## 2024-05-17 PROCEDURE — 1159F MED LIST DOCD IN RCRD: CPT | Mod: CPTII,S$GLB,, | Performed by: NURSE PRACTITIONER

## 2024-05-17 PROCEDURE — 99999 PR PBB SHADOW E&M-EST. PATIENT-LVL IV: CPT | Mod: PBBFAC,,, | Performed by: NURSE PRACTITIONER

## 2024-05-17 PROCEDURE — 85025 COMPLETE CBC W/AUTO DIFF WBC: CPT | Performed by: INTERNAL MEDICINE

## 2024-05-17 PROCEDURE — 3079F DIAST BP 80-89 MM HG: CPT | Mod: CPTII,S$GLB,, | Performed by: NURSE PRACTITIONER

## 2024-05-17 PROCEDURE — 3008F BODY MASS INDEX DOCD: CPT | Mod: CPTII,S$GLB,, | Performed by: NURSE PRACTITIONER

## 2024-05-17 RX ORDER — LIDOCAINE HYDROCHLORIDE 10 MG/ML
1 INJECTION, SOLUTION EPIDURAL; INFILTRATION; INTRACAUDAL; PERINEURAL ONCE
OUTPATIENT
Start: 2024-05-17 | End: 2024-05-17

## 2024-05-17 RX ORDER — PNV NO.95/FERROUS FUM/FOLIC AC 28MG-0.8MG
100 TABLET ORAL DAILY
COMMUNITY

## 2024-05-17 RX ORDER — TIRZEPATIDE 10 MG/.5ML
10 INJECTION, SOLUTION SUBCUTANEOUS
COMMUNITY

## 2024-05-17 RX ORDER — ACETAMINOPHEN 500 MG
5000 TABLET ORAL DAILY
COMMUNITY

## 2024-05-17 NOTE — PROGRESS NOTES
Subjective:      Patient ID: Kasi Main is a 54 y.o. male.    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  States that he would give blood about every 3-4 months.  Previously followed by hemonc at [facility].  Is self injecting testosterone once every 2 weeks followed by urology for testosterone.  Has been having testosterone therapy for the past 2-3 years.    Last phlebotomy was done on 2/16/2024.    Has MICHAEL not able to tolerate CPAP.  States that he will be looking into [employer].    Works at [employer] - 4- [employer] with Fridays off.     Denies cigarette smoking.  Has occasional alcohol.  Denies illicit drugs.    Denies f/c.  Has some night sweats at night - not drenching.  Denies any known abnormal lymphadenopathy.  States that his taking Monjero for the past 6 months Started at 230 lbs and is now at 195 lbs.              I have reviewed all of the patient's relevant lab work available in the medical record and have utilized this in my evaluation and management recommendations today.      Social History     Socioeconomic History    Marital status:    Tobacco Use    Smoking status: Never    Smokeless tobacco: Current     Types: Snuff   Substance and Sexual Activity    Alcohol use: Yes     Comment: rarely    Drug use: Never     Social Determinants of Health     Financial Resource Strain: Low Risk  (4/28/2023)    Received from [facility] Its Subsidiaries and Affiliates, [facility] Its Subsidiaries and Affiliates    Overall Financial Resource Strain (CARDIA)     Difficulty of Paying Living Expenses: Not hard at all   Food Insecurity: No Food Insecurity (4/28/2023)    Received from [facility] and Its Subsidiaries and Affiliates, [facility]    Hunger Vital Sign     Worried About Running Out of Food in the Last Year: Never true     Ran Out of Food in the Last Year: Never true   Transportation Needs: No Transportation Needs (4/28/2023)    [facility], [facility] and [facility]ili[facility]    PRAPARE - Transportation     Lack of Transportation (Medical): No     Lack of Transportation (Non-Medical): No   Physical Activity: Inactive (4/28/2023)    [facility], [facility] and Affiliates    Exercise Vital Sign     Days of Exercise per Week: 0 days     Minutes of Exercise per Session: 0 min   Stress: Stress Concern Present (4/28/2023)    Received from [facility] and [facility] and Affiliates, [facility]    [employer] - Occupational Stress Questionnaire     Feeling of Stress : To some extent   Housing Stability: Low Risk  (4/28/2023)    [facility], [facility] and Affiliates    Housing Stability Vital Sign     Unable to Pay for Housing in the Last Year: No     Number of Places Lived in the Last Year: 1     In the last 12 months, was there a time when you did not have a steady place to sleep or slept in a shelter (including now)?: No       Family History   Problem Relation Name Age of Onset    Clotting disorder Mother      Diabetes Mother      Cancer Father         Past Surgical History:   Procedure Laterality Date    COLONOSCOPY   2023    JOINT REPLACEMENT Right 08/26/2015    Right Hip       Past Medical History:   Diagnosis Date    ADD (attention deficit disorder)     Hypertension     Polycythemia        Review of Systems   Constitutional: Negative.    HENT: Negative.     Eyes: Negative.    Respiratory: Negative.     Cardiovascular: Negative.    Gastrointestinal: Negative.    Endocrine: Negative.    Genitourinary: Negative.    Musculoskeletal: Negative.    Skin: Negative.    Allergic/Immunologic: Negative.    Neurological: Negative.    Hematological: Negative.    Psychiatric/Behavioral: Negative.            Medication List with Changes/Refills   Current Medications    ASPIRIN (ECOTRIN) 81 MG EC TABLET    Take 81 mg by mouth once daily.    ATORVASTATIN (LIPITOR) 10 MG TABLET    Take 10 mg by mouth.    BENAZEPRIL (LOTENSIN) 20 MG TABLET    Take 10 mg by mouth once daily.    BLOOD SUGAR DIAGNOSTIC (BLOOD GLUCOSE TEST) STRP    1 strip by Other route.    BLOOD-GLUCOSE METER MISC    1 each by Other route.    CHOLECALCIFEROL, VITAMIN D3, (VITAMIN D3) 125 MCG (5,000 UNIT) TAB    Take 5,000 Units by mouth once daily.    CYANOCOBALAMIN (VITAMIN B-12) 100 MCG TABLET    Take 100 mcg by mouth once daily. Takes 2 tablets daily - 200 mcg    ERGOCALCIFEROL (ERGOCALCIFEROL) 50,000 UNIT CAP    Take 50,000 Units by mouth every 30 days.    FEBUXOSTAT (ULORIC) 80 MG TAB    Take 1 tablet by mouth.    METFORMIN (GLUCOPHAGE-XR) 500 MG ER 24HR TABLET    Take 1,000 mg by mouth 2 (two) times daily with meals.    SULFACETAMIDE SODIUM 10% (BLEPH-10) 10 % OPHTHALMIC SOLUTION    Place into both eyes daily as needed.    TESTOSTERONE CYPIONATE (DEPOTESTOTERONE CYPIONATE) 200 MG/ML INJECTION    Inject into the muscle every 14 (fourteen) days.    TIRZEPATIDE (MOUNJARO) 10 MG/0.5 ML PNIJ    Inject 10 mg into the skin every 7 days.   Discontinued Medications    SEMAGLUTIDE (OZEMPIC) 0.25 MG OR 0.5 MG(2 MG/1.5 ML) PEN INJECTOR    1 mg.        Objective:     Vitals:    05/17/24  1301   BP: (!) 146/84   Pulse: 90   Temp: 98.2 °F (36.8 °C)       Physical Exam  Vitals reviewed.   Constitutional:       Appearance: Normal appearance. He is obese.   HENT:      Head: Normocephalic and atraumatic.      Right Ear: External ear normal.      Left Ear: External ear normal.   Cardiovascular:      Rate and Rhythm: Normal rate and regular rhythm.      Heart sounds: Normal heart sounds, S1 normal and S2 normal.   Pulmonary:      Effort: Pulmonary effort is normal.      Breath sounds: Normal breath sounds.   Abdominal:      General: There is no distension.   Musculoskeletal:         General: Normal range of motion.      Cervical back: Normal range of motion.   Skin:     General: Skin is warm and dry.   Neurological:      General: No focal deficit present.      Mental Status: He is alert and oriented to person, place, and time.   Psychiatric:         Attention and Perception: Attention and perception normal.         Mood and Affect: Mood and affect normal.         Speech: Speech normal.         Behavior: Behavior normal. Behavior is cooperative.         Thought Content: Thought content normal.         Cognition and Memory: Cognition and memory normal.         Judgment: Judgment normal.         Assessment:     Problem List Items Addressed This Visit          Other    MICHAEL (obstructive sleep apnea)    Relevant Orders    CBC Auto Differential     Other Visit Diagnoses       Secondary polycythemia    -  Primary    Relevant Orders    CBC Auto Differential    Long-term current use of testosterone replacement therapy        Relevant Orders    CBC Auto Differential            Lab Results   Component Value Date    WBC 8.20 05/17/2024    RBC 5.42 05/17/2024    HGB 14.1 05/17/2024    HCT 44.2 05/17/2024    MCV 82 05/17/2024    MCH 26.0 (L) 05/17/2024    MCHC 31.9 (L) 05/17/2024    RDW 16.4 (H) 05/17/2024     05/17/2024    MPV 8.7 (L) 05/17/2024    GRAN 4.9 05/17/2024    GRAN 60.2 05/17/2024    LYMPH 2.6  "05/17/2024    LYMPH 31.2 05/17/2024    MONO 0.5 05/17/2024    MONO 5.6 05/17/2024    EOS 0.2 05/17/2024    BASO 0.04 05/17/2024    EOSINOPHIL 2.3 05/17/2024    BASOPHIL 0.5 05/17/2024      Lab Results   Component Value Date     05/17/2024    K 4.6 05/17/2024     05/17/2024    CO2 24 05/17/2024    BUN 16 05/17/2024    CREATININE 1.0 05/17/2024    CALCIUM 9.7 05/17/2024    ANIONGAP 10 05/17/2024    ESTGFRAFRICA 72 07/21/2023     Lab Results   Component Value Date    ALT 23 05/17/2024    AST 19 05/17/2024    ALKPHOS 66 05/17/2024    BILITOT 0.3 05/17/2024     No results found for: "IRON", "TRANSFERRIN", "TIBC", "FESATURATED", "FERRITIN"     Plan:   Secondary polycythemia  -     CBC Auto Differential; Standing    Long-term current use of testosterone replacement therapy  -     CBC Auto Differential; Standing    MICHAEL (obstructive sleep apnea)  -     CBC Auto Differential; Standing      Med Onc Chart Routing      Follow up with physician    Follow up with DANI 6 months. VV   Infusion scheduling note   n/a   Injection scheduling note n/a   Labs   Scheduling:  Preferred lab: [facility]  Lab interval:  Monthly standing cbc in [place] - possible monthly phlebotomies for hct >55% at [facility]   Imaging   N/a   Pharmacy appointment No pharmacy appointment needed      Other referrals       No additional referrals needed  n/a          Phlebotomies for hct >55%.      Collaborating Provider:  Dr. Henry Michel    Thank You,  Jennie Cox, FNP-C  Benign Hematology    "

## 2024-06-21 ENCOUNTER — LAB VISIT (OUTPATIENT)
Dept: LAB | Facility: HOSPITAL | Age: 54
End: 2024-06-21
Attending: NURSE PRACTITIONER
Payer: COMMERCIAL

## 2024-06-21 DIAGNOSIS — G47.33 OSA (OBSTRUCTIVE SLEEP APNEA): ICD-10-CM

## 2024-06-21 DIAGNOSIS — D75.1 SECONDARY POLYCYTHEMIA: ICD-10-CM

## 2024-06-21 DIAGNOSIS — Z79.890 LONG-TERM CURRENT USE OF TESTOSTERONE REPLACEMENT THERAPY: ICD-10-CM

## 2024-06-21 LAB
BASOPHILS # BLD AUTO: 0.04 K/UL (ref 0–0.2)
BASOPHILS NFR BLD: 0.5 % (ref 0–1.9)
DIFFERENTIAL METHOD BLD: ABNORMAL
EOSINOPHIL # BLD AUTO: 0.2 K/UL (ref 0–0.5)
EOSINOPHIL NFR BLD: 2.5 % (ref 0–8)
ERYTHROCYTE [DISTWIDTH] IN BLOOD BY AUTOMATED COUNT: 17.5 % (ref 11.5–14.5)
HCT VFR BLD AUTO: 46.3 % (ref 40–54)
HGB BLD-MCNC: 14.7 G/DL (ref 14–18)
IMM GRANULOCYTES # BLD AUTO: 0.01 K/UL (ref 0–0.04)
IMM GRANULOCYTES NFR BLD AUTO: 0.1 % (ref 0–0.5)
LYMPHOCYTES # BLD AUTO: 2.5 K/UL (ref 1–4.8)
LYMPHOCYTES NFR BLD: 32.3 % (ref 18–48)
MCH RBC QN AUTO: 26 PG (ref 27–31)
MCHC RBC AUTO-ENTMCNC: 31.7 G/DL (ref 32–36)
MCV RBC AUTO: 82 FL (ref 82–98)
MONOCYTES # BLD AUTO: 0.4 K/UL (ref 0.3–1)
MONOCYTES NFR BLD: 5.4 % (ref 4–15)
NEUTROPHILS # BLD AUTO: 4.6 K/UL (ref 1.8–7.7)
NEUTROPHILS NFR BLD: 59.2 % (ref 38–73)
NRBC BLD-RTO: 0 /100 WBC
PLATELET # BLD AUTO: 344 K/UL (ref 150–450)
PMV BLD AUTO: 8.5 FL (ref 9.2–12.9)
RBC # BLD AUTO: 5.65 M/UL (ref 4.6–6.2)
WBC # BLD AUTO: 7.71 K/UL (ref 3.9–12.7)

## 2024-06-21 PROCEDURE — 36415 COLL VENOUS BLD VENIPUNCTURE: CPT | Mod: PO | Performed by: NURSE PRACTITIONER

## 2024-06-21 PROCEDURE — 85025 COMPLETE CBC W/AUTO DIFF WBC: CPT | Mod: PO | Performed by: NURSE PRACTITIONER

## 2024-08-09 ENCOUNTER — LAB VISIT (OUTPATIENT)
Dept: LAB | Facility: HOSPITAL | Age: 54
End: 2024-08-09
Attending: NURSE PRACTITIONER
Payer: COMMERCIAL

## 2024-08-09 DIAGNOSIS — D75.1 SECONDARY POLYCYTHEMIA: ICD-10-CM

## 2024-08-09 DIAGNOSIS — G47.33 OSA (OBSTRUCTIVE SLEEP APNEA): ICD-10-CM

## 2024-08-09 DIAGNOSIS — Z79.890 LONG-TERM CURRENT USE OF TESTOSTERONE REPLACEMENT THERAPY: ICD-10-CM

## 2024-08-09 LAB
BASOPHILS # BLD AUTO: 0.04 K/UL (ref 0–0.2)
BASOPHILS NFR BLD: 0.5 % (ref 0–1.9)
DIFFERENTIAL METHOD BLD: ABNORMAL
EOSINOPHIL # BLD AUTO: 0.2 K/UL (ref 0–0.5)
EOSINOPHIL NFR BLD: 2.7 % (ref 0–8)
ERYTHROCYTE [DISTWIDTH] IN BLOOD BY AUTOMATED COUNT: 15.1 % (ref 11.5–14.5)
HCT VFR BLD AUTO: 47.8 % (ref 40–54)
HGB BLD-MCNC: 15.2 G/DL (ref 14–18)
IMM GRANULOCYTES # BLD AUTO: 0.03 K/UL (ref 0–0.04)
IMM GRANULOCYTES NFR BLD AUTO: 0.4 % (ref 0–0.5)
LYMPHOCYTES # BLD AUTO: 2.3 K/UL (ref 1–4.8)
LYMPHOCYTES NFR BLD: 31.8 % (ref 18–48)
MCH RBC QN AUTO: 26.8 PG (ref 27–31)
MCHC RBC AUTO-ENTMCNC: 31.8 G/DL (ref 32–36)
MCV RBC AUTO: 84 FL (ref 82–98)
MONOCYTES # BLD AUTO: 0.4 K/UL (ref 0.3–1)
MONOCYTES NFR BLD: 5.3 % (ref 4–15)
NEUTROPHILS # BLD AUTO: 4.3 K/UL (ref 1.8–7.7)
NEUTROPHILS NFR BLD: 59.3 % (ref 38–73)
NRBC BLD-RTO: 0 /100 WBC
PLATELET # BLD AUTO: 351 K/UL (ref 150–450)
PMV BLD AUTO: 9 FL (ref 9.2–12.9)
RBC # BLD AUTO: 5.67 M/UL (ref 4.6–6.2)
WBC # BLD AUTO: 7.29 K/UL (ref 3.9–12.7)

## 2024-08-09 PROCEDURE — 36415 COLL VENOUS BLD VENIPUNCTURE: CPT | Mod: PO | Performed by: NURSE PRACTITIONER

## 2024-08-09 PROCEDURE — 85025 COMPLETE CBC W/AUTO DIFF WBC: CPT | Mod: PO | Performed by: NURSE PRACTITIONER

## 2024-09-06 ENCOUNTER — LAB VISIT (OUTPATIENT)
Dept: LAB | Facility: HOSPITAL | Age: 54
End: 2024-09-06
Attending: NURSE PRACTITIONER
Payer: COMMERCIAL

## 2024-09-06 DIAGNOSIS — Z79.890 LONG-TERM CURRENT USE OF TESTOSTERONE REPLACEMENT THERAPY: ICD-10-CM

## 2024-09-06 DIAGNOSIS — D75.1 SECONDARY POLYCYTHEMIA: ICD-10-CM

## 2024-09-06 DIAGNOSIS — G47.33 OSA (OBSTRUCTIVE SLEEP APNEA): ICD-10-CM

## 2024-09-06 LAB
BASOPHILS # BLD AUTO: 0.04 K/UL (ref 0–0.2)
BASOPHILS NFR BLD: 0.5 % (ref 0–1.9)
DIFFERENTIAL METHOD BLD: ABNORMAL
EOSINOPHIL # BLD AUTO: 0.2 K/UL (ref 0–0.5)
EOSINOPHIL NFR BLD: 2.6 % (ref 0–8)
ERYTHROCYTE [DISTWIDTH] IN BLOOD BY AUTOMATED COUNT: 15 % (ref 11.5–14.5)
HCT VFR BLD AUTO: 44.4 % (ref 40–54)
HGB BLD-MCNC: 14.6 G/DL (ref 14–18)
IMM GRANULOCYTES # BLD AUTO: 0.03 K/UL (ref 0–0.04)
IMM GRANULOCYTES NFR BLD AUTO: 0.3 % (ref 0–0.5)
LYMPHOCYTES # BLD AUTO: 2.8 K/UL (ref 1–4.8)
LYMPHOCYTES NFR BLD: 31.9 % (ref 18–48)
MCH RBC QN AUTO: 27.9 PG (ref 27–31)
MCHC RBC AUTO-ENTMCNC: 32.9 G/DL (ref 32–36)
MCV RBC AUTO: 85 FL (ref 82–98)
MONOCYTES # BLD AUTO: 0.6 K/UL (ref 0.3–1)
MONOCYTES NFR BLD: 6.4 % (ref 4–15)
NEUTROPHILS # BLD AUTO: 5.1 K/UL (ref 1.8–7.7)
NEUTROPHILS NFR BLD: 58.3 % (ref 38–73)
NRBC BLD-RTO: 0 /100 WBC
PLATELET # BLD AUTO: 294 K/UL (ref 150–450)
PMV BLD AUTO: 8.5 FL (ref 9.2–12.9)
RBC # BLD AUTO: 5.23 M/UL (ref 4.6–6.2)
WBC # BLD AUTO: 8.74 K/UL (ref 3.9–12.7)

## 2024-09-06 PROCEDURE — 85025 COMPLETE CBC W/AUTO DIFF WBC: CPT | Mod: PO | Performed by: NURSE PRACTITIONER

## 2024-09-06 PROCEDURE — 36415 COLL VENOUS BLD VENIPUNCTURE: CPT | Mod: PO | Performed by: NURSE PRACTITIONER

## 2024-09-08 ENCOUNTER — PATIENT MESSAGE (OUTPATIENT)
Dept: HEMATOLOGY/ONCOLOGY | Facility: CLINIC | Age: 54
End: 2024-09-08
Payer: COMMERCIAL

## 2024-10-04 ENCOUNTER — LAB VISIT (OUTPATIENT)
Dept: LAB | Facility: HOSPITAL | Age: 54
End: 2024-10-04
Attending: NURSE PRACTITIONER
Payer: COMMERCIAL

## 2024-10-04 ENCOUNTER — PATIENT MESSAGE (OUTPATIENT)
Dept: HEMATOLOGY/ONCOLOGY | Facility: CLINIC | Age: 54
End: 2024-10-04
Payer: COMMERCIAL

## 2024-10-04 DIAGNOSIS — G47.33 OSA (OBSTRUCTIVE SLEEP APNEA): ICD-10-CM

## 2024-10-04 DIAGNOSIS — D75.1 SECONDARY POLYCYTHEMIA: ICD-10-CM

## 2024-10-04 DIAGNOSIS — Z79.890 LONG-TERM CURRENT USE OF TESTOSTERONE REPLACEMENT THERAPY: ICD-10-CM

## 2024-10-04 LAB
BASOPHILS # BLD AUTO: 0.03 K/UL (ref 0–0.2)
BASOPHILS NFR BLD: 0.4 % (ref 0–1.9)
DIFFERENTIAL METHOD BLD: ABNORMAL
EOSINOPHIL # BLD AUTO: 0.2 K/UL (ref 0–0.5)
EOSINOPHIL NFR BLD: 2 % (ref 0–8)
ERYTHROCYTE [DISTWIDTH] IN BLOOD BY AUTOMATED COUNT: 15.1 % (ref 11.5–14.5)
HCT VFR BLD AUTO: 50.2 % (ref 40–54)
HGB BLD-MCNC: 16.1 G/DL (ref 14–18)
IMM GRANULOCYTES # BLD AUTO: 0.02 K/UL (ref 0–0.04)
IMM GRANULOCYTES NFR BLD AUTO: 0.3 % (ref 0–0.5)
LYMPHOCYTES # BLD AUTO: 2.3 K/UL (ref 1–4.8)
LYMPHOCYTES NFR BLD: 30.2 % (ref 18–48)
MCH RBC QN AUTO: 27.9 PG (ref 27–31)
MCHC RBC AUTO-ENTMCNC: 32.1 G/DL (ref 32–36)
MCV RBC AUTO: 87 FL (ref 82–98)
MONOCYTES # BLD AUTO: 0.5 K/UL (ref 0.3–1)
MONOCYTES NFR BLD: 6.8 % (ref 4–15)
NEUTROPHILS # BLD AUTO: 4.5 K/UL (ref 1.8–7.7)
NEUTROPHILS NFR BLD: 60.3 % (ref 38–73)
NRBC BLD-RTO: 0 /100 WBC
PLATELET # BLD AUTO: 352 K/UL (ref 150–450)
PMV BLD AUTO: 8.5 FL (ref 9.2–12.9)
RBC # BLD AUTO: 5.78 M/UL (ref 4.6–6.2)
WBC # BLD AUTO: 7.52 K/UL (ref 3.9–12.7)

## 2024-10-04 PROCEDURE — 85025 COMPLETE CBC W/AUTO DIFF WBC: CPT | Mod: PO | Performed by: NURSE PRACTITIONER

## 2024-10-04 PROCEDURE — 36415 COLL VENOUS BLD VENIPUNCTURE: CPT | Mod: PO | Performed by: NURSE PRACTITIONER

## 2024-11-08 ENCOUNTER — OFFICE VISIT (OUTPATIENT)
Dept: HEMATOLOGY/ONCOLOGY | Facility: CLINIC | Age: 54
End: 2024-11-08
Payer: COMMERCIAL

## 2024-11-08 ENCOUNTER — LAB VISIT (OUTPATIENT)
Dept: LAB | Facility: HOSPITAL | Age: 54
End: 2024-11-08
Attending: NURSE PRACTITIONER
Payer: COMMERCIAL

## 2024-11-08 ENCOUNTER — PATIENT MESSAGE (OUTPATIENT)
Dept: HEMATOLOGY/ONCOLOGY | Facility: CLINIC | Age: 54
End: 2024-11-08

## 2024-11-08 DIAGNOSIS — Z79.890 LONG-TERM CURRENT USE OF TESTOSTERONE REPLACEMENT THERAPY: Primary | ICD-10-CM

## 2024-11-08 DIAGNOSIS — D75.1 SECONDARY POLYCYTHEMIA: ICD-10-CM

## 2024-11-08 DIAGNOSIS — G47.33 OSA (OBSTRUCTIVE SLEEP APNEA): ICD-10-CM

## 2024-11-08 DIAGNOSIS — Z79.890 LONG-TERM CURRENT USE OF TESTOSTERONE REPLACEMENT THERAPY: ICD-10-CM

## 2024-11-08 LAB
BASOPHILS # BLD AUTO: 0.05 K/UL (ref 0–0.2)
BASOPHILS NFR BLD: 0.6 % (ref 0–1.9)
DIFFERENTIAL METHOD BLD: ABNORMAL
EOSINOPHIL # BLD AUTO: 0.3 K/UL (ref 0–0.5)
EOSINOPHIL NFR BLD: 3.4 % (ref 0–8)
ERYTHROCYTE [DISTWIDTH] IN BLOOD BY AUTOMATED COUNT: 14.5 % (ref 11.5–14.5)
HCT VFR BLD AUTO: 48.6 % (ref 40–54)
HGB BLD-MCNC: 16 G/DL (ref 14–18)
IMM GRANULOCYTES # BLD AUTO: 0.03 K/UL (ref 0–0.04)
IMM GRANULOCYTES NFR BLD AUTO: 0.4 % (ref 0–0.5)
LYMPHOCYTES # BLD AUTO: 2.7 K/UL (ref 1–4.8)
LYMPHOCYTES NFR BLD: 32.3 % (ref 18–48)
MCH RBC QN AUTO: 28.2 PG (ref 27–31)
MCHC RBC AUTO-ENTMCNC: 32.9 G/DL (ref 32–36)
MCV RBC AUTO: 86 FL (ref 82–98)
MONOCYTES # BLD AUTO: 0.5 K/UL (ref 0.3–1)
MONOCYTES NFR BLD: 6 % (ref 4–15)
NEUTROPHILS # BLD AUTO: 4.8 K/UL (ref 1.8–7.7)
NEUTROPHILS NFR BLD: 57.3 % (ref 38–73)
NRBC BLD-RTO: 0 /100 WBC
PLATELET # BLD AUTO: 363 K/UL (ref 150–450)
PMV BLD AUTO: 8.8 FL (ref 9.2–12.9)
RBC # BLD AUTO: 5.67 M/UL (ref 4.6–6.2)
WBC # BLD AUTO: 8.3 K/UL (ref 3.9–12.7)

## 2024-11-08 PROCEDURE — 36415 COLL VENOUS BLD VENIPUNCTURE: CPT | Mod: PO | Performed by: NURSE PRACTITIONER

## 2024-11-08 PROCEDURE — 85025 COMPLETE CBC W/AUTO DIFF WBC: CPT | Mod: PO | Performed by: NURSE PRACTITIONER

## 2024-11-08 NOTE — PROGRESS NOTES
The patient location is: car  The chief complaint leading to consultation is: no complaints    Visit type: audiovisual    Face to Face time with patient: 15  30 minutes of total time spent on the encounter, which includes face to face time and non-face to face time preparing to see the patient (eg, review of tests), Obtaining and/or reviewing separately obtained history, Documenting clinical information in the electronic or other health record, Independently interpreting results (not separately reported) and communicating results to the patient/family/caregiver, or Care coordination (not separately reported).       Each patient to whom he or she provides medical services by telemedicine is:  (1) informed of the relationship between the physician and patient and the respective role of any other health care provider with respect to management of the patient; and (2) notified that he or she may decline to receive medical services by telemedicine and may withdraw from such care at any time.       Patient ID: Kasi Main is a 54 y.o. male.    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  States that he would give blood about every 3-4 months.  Previously followed by hemonc at [facility].  Is self injecting testosterone once every 2 weeks followed by urology for testosterone.  Has been having testosterone therapy for the past 2-3 years.     Last phlebotomy was done on 2/16/2024.     Has MICHAEL not able to tolerate CPAP.  States that he will be looking into [employer].     Works at [employer] with Fridays off.      Denies cigarette smoking.  Has occasional alcohol.  Denies illicit drugs.     Denies f/c.  Has some night sweats at night - not drenching.  Denies any known abnormal lymphadenopathy.  States that his taking Monjero for the past 6 months Started at 230 lbs and is now at  195 lbs.    Interval History:  11/8/2024 still doing testosterone injections every 2 weeks.  Has not donate blood.  Maintaining his weight at 195 lbs.  Not using cpap.  States feels constricted and panicked.  Has not required therapeutic phlebotomy since being seen in the hematology department  I have reviewed all of the patient's relevant lab work available in the medical record and have utilized this in my evaluation and management recommendations today.      Social History     Socioeconomic History    Marital status:    Tobacco Use    Smoking status: Never    Smokeless tobacco: Current     Types: Snuff   Substance and Sexual Activity    Alcohol use: Yes     Comment: rarely    Drug use: Never     Social Drivers of Health     Financial Resource Strain: Patient Declined (11/8/2024)    Overall Financial Resource Strain (CARDIA)     Difficulty of Paying Living Expenses: Patient declined   Food Insecurity: Patient Declined (11/8/2024)    Hunger Vital Sign     Worried About Running Out of Food in the Last Year: Patient declined     Ran Out of Food in the Last Year: Patient declined   Transportation Needs: No Transportation Needs (4/28/2023)    [facility], [facility] Affiliates    PRAPARE - Transportation     Lack of Transportation (Medical): No     Lack of Transportation (Non-Medical): No   Physical Activity: Inactive (11/8/2024)    Exercise Vital Sign     Days of Exercise per Week: 0 days     Minutes of Exercise per Session: 0 min   Stress: No Stress Concern Present (11/8/2024)    [employer] [place] [employer] - Occupational Stress Questionnaire     Feeling of Stress : Not at all   Housing Stability: Unknown (11/8/2024)    Housing Stability Vital Sign     Unable to Pay for Housing in the Last Year: Patient declined       Family History   Problem  Relation Name Age of Onset    Clotting disorder Mother      Diabetes Mother      Cancer Father         Past Surgical History:   Procedure Laterality Date    COLONOSCOPY  2023    JOINT REPLACEMENT Right 08/26/2015    Right Hip       Past Medical History:   Diagnosis Date    ADD (attention deficit disorder)     Hypertension     Polycythemia        Review of Systems   Constitutional: Negative.    HENT: Negative.     Eyes: Negative.    Respiratory: Negative.     Cardiovascular: Negative.    Gastrointestinal: Negative.    Endocrine: Negative.    Genitourinary: Negative.    Musculoskeletal: Negative.    Skin: Negative.    Allergic/Immunologic: Negative.    Neurological: Negative.    Hematological: Negative.    Psychiatric/Behavioral: Negative.            Medication List with Changes/Refills   Current Medications    ASPIRIN (ECOTRIN) 81 MG EC TABLET    Take 81 mg by mouth once daily.    ATORVASTATIN (LIPITOR) 10 MG TABLET    Take 10 mg by mouth.    BENAZEPRIL (LOTENSIN) 20 MG TABLET    Take 10 mg by mouth once daily.    BLOOD SUGAR DIAGNOSTIC (BLOOD GLUCOSE TEST) STRP    1 strip by Other route.    BLOOD-GLUCOSE METER MISC    1 each by Other route.    CHOLECALCIFEROL, VITAMIN D3, (VITAMIN D3) 125 MCG (5,000 UNIT) TAB    Take 5,000 Units by mouth once daily.    CYANOCOBALAMIN (VITAMIN B-12) 100 MCG TABLET    Take 100 mcg by mouth once daily. Takes 2 tablets daily - 200 mcg    ERGOCALCIFEROL (ERGOCALCIFEROL) 50,000 UNIT CAP    Take 50,000 Units by mouth every 30 days.    FEBUXOSTAT (ULORIC) 80 MG TAB    Take 1 tablet by mouth.    METFORMIN (GLUCOPHAGE-XR) 500 MG ER 24HR TABLET    Take 1,000 mg by mouth 2 (two) times daily with meals.    SULFACETAMIDE SODIUM 10% (BLEPH-10) 10 % OPHTHALMIC SOLUTION    Place into both eyes daily as needed.    TESTOSTERONE CYPIONATE (DEPOTESTOTERONE CYPIONATE) 200 MG/ML INJECTION    Inject into the muscle every 14 (fourteen) days.    TIRZEPATIDE (MOUNJARO) 10 MG/0.5 ML PNIJ    Inject 10 mg into  "the skin every 7 days.        Objective:   There were no vitals filed for this visit.    Physical Exam  Constitutional:       Appearance: Normal appearance.   Pulmonary:      Effort: Pulmonary effort is normal.   Neurological:      Mental Status: He is alert and oriented to person, place, and time.   Psychiatric:         Mood and Affect: Mood normal.         Behavior: Behavior normal.         Thought Content: Thought content normal.         Judgment: Judgment normal.         Assessment:     Problem List Items Addressed This Visit    None      Lab Results   Component Value Date    WBC 7.52 10/04/2024    RBC 5.78 10/04/2024    HGB 16.1 10/04/2024    HCT 50.2 10/04/2024    MCV 87 10/04/2024    MCH 27.9 10/04/2024    MCHC 32.1 10/04/2024    RDW 15.1 (H) 10/04/2024     10/04/2024    MPV 8.5 (L) 10/04/2024    GRAN 4.5 10/04/2024    GRAN 60.3 10/04/2024    LYMPH 2.3 10/04/2024    LYMPH 30.2 10/04/2024    MONO 0.5 10/04/2024    MONO 6.8 10/04/2024    EOS 0.2 10/04/2024    BASO 0.03 10/04/2024    EOSINOPHIL 2.0 10/04/2024    BASOPHIL 0.4 10/04/2024      Lab Results   Component Value Date     05/17/2024    K 4.6 05/17/2024     05/17/2024    CO2 24 05/17/2024    BUN 16 05/17/2024    CREATININE 1.0 05/17/2024    CALCIUM 9.7 05/17/2024    ANIONGAP 10 05/17/2024    ESTGFRAFRICA 72 07/21/2023     Lab Results   Component Value Date    ALT 23 05/17/2024    AST 19 05/17/2024    ALKPHOS 66 05/17/2024    BILITOT 0.3 05/17/2024     No results found for: "IRON", "TRANSFERRIN", "TIBC", "FESATURATED", "FERRITIN"     Plan:   There are no diagnoses linked to this encounter.    Med Onc Chart Routing      Follow up with physician    Follow up with DANI . 8 months with VV   Infusion scheduling note   every 6 week phlebotomy for hct >55 at the [facility]   Injection scheduling note n/a   Labs   Scheduling:  Preferred lab: [facility]  Lab interval:  standing cbc's every 6 weeks x 8 months   Imaging   N/a   Pharmacy appointment No " pharmacy appointment needed      Other referrals       No additional referrals needed  n/a         Continue working on weight loss.  CBC every 6 weeks with possible phlebotomy after for hct > 55%.  F/u in 8 months with VV to review results and discuss continued f//u/plans going forward.         Collaborating Provider:  Dr. Henry Michel    Thank You,  DHRUV Fortune-C  Benign Hematology

## 2025-02-14 ENCOUNTER — LAB VISIT (OUTPATIENT)
Dept: LAB | Facility: HOSPITAL | Age: 55
End: 2025-02-14
Attending: NURSE PRACTITIONER
Payer: COMMERCIAL

## 2025-02-14 DIAGNOSIS — G47.33 OSA (OBSTRUCTIVE SLEEP APNEA): ICD-10-CM

## 2025-02-14 DIAGNOSIS — Z79.890 LONG-TERM CURRENT USE OF TESTOSTERONE REPLACEMENT THERAPY: ICD-10-CM

## 2025-02-14 DIAGNOSIS — D75.1 SECONDARY POLYCYTHEMIA: ICD-10-CM

## 2025-02-14 LAB
BASOPHILS # BLD AUTO: 0.03 K/UL (ref 0–0.2)
BASOPHILS NFR BLD: 0.4 % (ref 0–1.9)
DIFFERENTIAL METHOD BLD: ABNORMAL
EOSINOPHIL # BLD AUTO: 0.2 K/UL (ref 0–0.5)
EOSINOPHIL NFR BLD: 2.4 % (ref 0–8)
ERYTHROCYTE [DISTWIDTH] IN BLOOD BY AUTOMATED COUNT: 14 % (ref 11.5–14.5)
HCT VFR BLD AUTO: 48.4 % (ref 40–54)
HGB BLD-MCNC: 15.8 G/DL (ref 14–18)
IMM GRANULOCYTES # BLD AUTO: 0.02 K/UL (ref 0–0.04)
IMM GRANULOCYTES NFR BLD AUTO: 0.3 % (ref 0–0.5)
LYMPHOCYTES # BLD AUTO: 2.2 K/UL (ref 1–4.8)
LYMPHOCYTES NFR BLD: 31.4 % (ref 18–48)
MCH RBC QN AUTO: 28.5 PG (ref 27–31)
MCHC RBC AUTO-ENTMCNC: 32.6 G/DL (ref 32–36)
MCV RBC AUTO: 87 FL (ref 82–98)
MONOCYTES # BLD AUTO: 0.5 K/UL (ref 0.3–1)
MONOCYTES NFR BLD: 6.4 % (ref 4–15)
NEUTROPHILS # BLD AUTO: 4.2 K/UL (ref 1.8–7.7)
NEUTROPHILS NFR BLD: 59.1 % (ref 38–73)
NRBC BLD-RTO: 0 /100 WBC
PLATELET # BLD AUTO: 304 K/UL (ref 150–450)
PMV BLD AUTO: 8.4 FL (ref 9.2–12.9)
RBC # BLD AUTO: 5.54 M/UL (ref 4.6–6.2)
WBC # BLD AUTO: 7.14 K/UL (ref 3.9–12.7)

## 2025-02-14 PROCEDURE — 36415 COLL VENOUS BLD VENIPUNCTURE: CPT | Mod: PO | Performed by: NURSE PRACTITIONER

## 2025-02-14 PROCEDURE — 85025 COMPLETE CBC W/AUTO DIFF WBC: CPT | Mod: PO | Performed by: NURSE PRACTITIONER

## 2025-02-20 ENCOUNTER — PATIENT MESSAGE (OUTPATIENT)
Dept: HEMATOLOGY/ONCOLOGY | Facility: CLINIC | Age: 55
End: 2025-02-20
Payer: COMMERCIAL

## 2025-02-20 DIAGNOSIS — D75.1 SECONDARY POLYCYTHEMIA: Primary | ICD-10-CM

## 2025-07-18 ENCOUNTER — LAB VISIT (OUTPATIENT)
Dept: LAB | Facility: HOSPITAL | Age: 55
End: 2025-07-18
Attending: NURSE PRACTITIONER
Payer: COMMERCIAL

## 2025-07-18 DIAGNOSIS — D75.1 SECONDARY POLYCYTHEMIA: ICD-10-CM

## 2025-07-18 LAB
ABSOLUTE EOSINOPHIL (OHS): 0.25 K/UL
ABSOLUTE MONOCYTE (OHS): 0.57 K/UL (ref 0.3–1)
ABSOLUTE NEUTROPHIL COUNT (OHS): 4.02 K/UL (ref 1.8–7.7)
BASOPHILS # BLD AUTO: 0.04 K/UL
BASOPHILS NFR BLD AUTO: 0.6 %
ERYTHROCYTE [DISTWIDTH] IN BLOOD BY AUTOMATED COUNT: 13.4 % (ref 11.5–14.5)
HCT VFR BLD AUTO: 53.2 % (ref 40–54)
HGB BLD-MCNC: 17.6 GM/DL (ref 14–18)
IMM GRANULOCYTES # BLD AUTO: 0.02 K/UL (ref 0–0.04)
IMM GRANULOCYTES NFR BLD AUTO: 0.3 % (ref 0–0.5)
LYMPHOCYTES # BLD AUTO: 2.21 K/UL (ref 1–4.8)
MCH RBC QN AUTO: 29 PG (ref 27–31)
MCHC RBC AUTO-ENTMCNC: 33.1 G/DL (ref 32–36)
MCV RBC AUTO: 88 FL (ref 82–98)
NUCLEATED RBC (/100WBC) (OHS): 0 /100 WBC
PLATELET # BLD AUTO: 328 K/UL (ref 150–450)
PMV BLD AUTO: 8.9 FL (ref 9.2–12.9)
RBC # BLD AUTO: 6.07 M/UL (ref 4.6–6.2)
RELATIVE EOSINOPHIL (OHS): 3.5 %
RELATIVE LYMPHOCYTE (OHS): 31.1 % (ref 18–48)
RELATIVE MONOCYTE (OHS): 8 % (ref 4–15)
RELATIVE NEUTROPHIL (OHS): 56.5 % (ref 38–73)
WBC # BLD AUTO: 7.11 K/UL (ref 3.9–12.7)

## 2025-07-18 PROCEDURE — 85025 COMPLETE CBC W/AUTO DIFF WBC: CPT | Mod: PO

## 2025-07-18 PROCEDURE — 36415 COLL VENOUS BLD VENIPUNCTURE: CPT | Mod: PO

## 2025-08-06 ENCOUNTER — TELEPHONE (OUTPATIENT)
Dept: INFUSION THERAPY | Facility: HOSPITAL | Age: 55
End: 2025-08-06
Payer: COMMERCIAL

## 2025-08-08 ENCOUNTER — INFUSION (OUTPATIENT)
Dept: INFUSION THERAPY | Facility: HOSPITAL | Age: 55
End: 2025-08-08
Attending: NURSE PRACTITIONER
Payer: COMMERCIAL

## 2025-08-08 VITALS
RESPIRATION RATE: 18 BRPM | OXYGEN SATURATION: 98 % | SYSTOLIC BLOOD PRESSURE: 133 MMHG | HEART RATE: 100 BPM | DIASTOLIC BLOOD PRESSURE: 93 MMHG | TEMPERATURE: 98 F

## 2025-08-08 DIAGNOSIS — D75.1 SECONDARY POLYCYTHEMIA: ICD-10-CM

## 2025-08-08 DIAGNOSIS — G47.33 OSA (OBSTRUCTIVE SLEEP APNEA): ICD-10-CM

## 2025-08-08 DIAGNOSIS — R79.89 LOW TESTOSTERONE: Primary | ICD-10-CM

## 2025-08-08 DIAGNOSIS — D75.1 SECONDARY POLYCYTHEMIA: Primary | ICD-10-CM

## 2025-08-08 RX ORDER — LIDOCAINE HYDROCHLORIDE 10 MG/ML
1 INJECTION, SOLUTION EPIDURAL; INFILTRATION; INTRACAUDAL; PERINEURAL ONCE
Status: DISCONTINUED | OUTPATIENT
Start: 2025-08-08 | End: 2025-08-08

## 2025-08-08 RX ORDER — LIDOCAINE HYDROCHLORIDE 10 MG/ML
1 INJECTION, SOLUTION EPIDURAL; INFILTRATION; INTRACAUDAL; PERINEURAL ONCE
OUTPATIENT
Start: 2025-08-15 | End: 2025-08-15

## 2025-08-08 NOTE — DISCHARGE INSTRUCTIONS
[facility]  [id number] [address]  [address]  [phone] phone     [phone] fax  Hours of Operation: Monday- Friday 8:00am- 5:00pm  After hours phone  [phone]  Hematology / Oncology Physicians on call      NOLBERTO Smith [address] NP Phaon Dunbar, NP Khelsea Conley, FNP    Please call with any concerns regarding your appointment today.

## 2025-08-08 NOTE — NURSING
Patient sent to lab to get STAT CBC done prior to phlebotomy per NP orders. Patient aware that HCT level has to be outside Provider parameters before proceeding with Phlebotomy, verbalized understanding.    Patient labs resulted, pt within parameters and phlebotomy not needed today. Pt aware of next appt.

## 2025-08-22 ENCOUNTER — PATIENT MESSAGE (OUTPATIENT)
Dept: HEMATOLOGY/ONCOLOGY | Facility: CLINIC | Age: 55
End: 2025-08-22
Payer: COMMERCIAL